# Patient Record
Sex: MALE | Race: WHITE
[De-identification: names, ages, dates, MRNs, and addresses within clinical notes are randomized per-mention and may not be internally consistent; named-entity substitution may affect disease eponyms.]

---

## 2019-03-10 ENCOUNTER — HOSPITAL ENCOUNTER (EMERGENCY)
Dept: HOSPITAL 47 - EC | Age: 19
LOS: 1 days | Discharge: HOME | End: 2019-03-11
Payer: COMMERCIAL

## 2019-03-10 VITALS — TEMPERATURE: 97.8 F

## 2019-03-10 DIAGNOSIS — Z86.14: ICD-10-CM

## 2019-03-10 DIAGNOSIS — L50.9: Primary | ICD-10-CM

## 2019-03-10 PROCEDURE — 99284 EMERGENCY DEPT VISIT MOD MDM: CPT

## 2019-03-11 VITALS — SYSTOLIC BLOOD PRESSURE: 122 MMHG | HEART RATE: 73 BPM | RESPIRATION RATE: 14 BRPM | DIASTOLIC BLOOD PRESSURE: 61 MMHG

## 2019-03-11 NOTE — ED
General Adult HPI





- General


Chief complaint: Skin/Abscess/Foreign Body


Stated complaint: Allergic Reaction SANTOS/Skin Irritation


Time Seen by Provider: 03/11/19 00:10


Source: patient


Mode of arrival: ambulatory


Limitations: no limitations





- History of Present Illness


Initial comments: 





Dictation was produced using dragon dictation software. please excuse any 

grammatical, word or spelling errors. 





Chief Complaint: 18-year-old male presents with pruritic erythematous rash.





History of Present Illness: Now.  He took a nap earlier today.  Proximal forearm

as ago upon waking up with the from the nap patient noted a severe the EKG and 

erythematous rash.  He states that the rash did not hurt.  He went to the 

pharmacy and took a pill of Benadryl.  He states the rash is improved since 

then.  He states the rash is mostly gone.  Patient was concerned given to the 

emergency department.  Patient does not know of any food or drug ALLERGIES at 

this time.  Denies any changes in his soap or skin products.








The ROS documented in this emergency department record has been reviewed and 

confirmed by me.  Those systems with pertinent positive or negative responses 

have been documented in the HPI.  All other systems are other negative and/or 

noncontributory.








PHYSICAL EXAM:


General Impression: Alert and oriented x3, not in acute distress


HEENT: Normocephalic atraumatic, extra-ocular movements intact, pupils equal and

reactive to light bilaterally, mucous membranes moist.


Cardiovascular: Heart regular rate and rhythm, S1&S2 audible, no murmurs, rubs 

or gallops


Chest: Lungs clear to auscultation bilaterally, no rhonchi, no wheeze, no rales


Abdomen: Bowel sounds present, abdomen soft, non-tender, non-distended, no 

organomegaly


Musculoskeletal: Pulses present and equal in all extremities, no peripheral 

edema


Motor: Power 5/5 bilaterally, no focal deficits noted


Neurological: CN II-XII grossly intact, no focal motor or sensory deficits noted


Skin: Mild erythema to the anterior chest


Psych: Normal affect and mood





ED course: 18-year-old male presents with urticarial rash.  All signs upon 

arrival are within acceptable limits.  Patient told of the signs of severe 

ALLERGIC reaction to seek medical attention if experiences throat swelling, 

lightheadedness.  Vision otherwise told to keep a log of exposures to identify 

any sort of cause to rash.  Patient told to follow up with primary care 

physician should he experience chronic urticaria.  Patient otherwise feels well 

is given 1 dose of by mouth Decadron.  Patient clear for discharge.














- Related Data


                                    Allergies











Allergy/AdvReac Type Severity Reaction Status Date / Time


 


No Known Allergies Allergy   Verified 03/10/19 23:07














Review of Systems


ROS Statement: 


Those systems with pertinent positive or pertinent negative responses have been 

documented in the HPI.





ROS Other: All systems not noted in ROS Statement are negative.





Past Medical History


Past Medical History: No Reported History


History of Any Multi-Drug Resistant Organisms: MRSA


Date of last positivie culture/infection: 2006


MDRO Source:: right hip


Additional Past Surgical History / Comment(s): right hip surgery for MRSA 

removal,


Past Psychological History: No Psychological Hx Reported


Smoking Status: Never smoker


Past Alcohol Use History: Occasional


Past Drug Use History: Marijuana





General Exam


Limitations: no limitations





Course





                                   Vital Signs











  03/10/19 03/11/19





  23:01 00:08


 


Temperature 97.8 F 


 


Pulse Rate 74 73


 


Respiratory 17 14





Rate  


 


Blood Pressure 134/88 122/61


 


O2 Sat by Pulse 99 99





Oximetry  














Disposition


Clinical Impression: 


 Urticaria





Disposition: HOME SELF-CARE


Condition: Good


Instructions (If sedation given, give patient instructions):  Urticaria (ED)


Is patient prescribed a controlled substance at d/c from ED?: No


Referrals: 


None,Stated [Primary Care Provider] - 1-2 days


Time of Disposition: 00:17

## 2024-08-06 ENCOUNTER — HOSPITAL ENCOUNTER (EMERGENCY)
Dept: HOSPITAL 47 - EC | Age: 24
Discharge: HOME | End: 2024-08-06
Payer: COMMERCIAL

## 2024-08-06 PROCEDURE — 99283 EMERGENCY DEPT VISIT LOW MDM: CPT

## 2025-04-15 ENCOUNTER — HOSPITAL ENCOUNTER (OUTPATIENT)
Dept: HOSPITAL 47 - LABWHC1 | Age: 25
Discharge: HOME | End: 2025-04-15
Attending: PHYSICIAN ASSISTANT
Payer: COMMERCIAL

## 2025-04-15 ENCOUNTER — HOSPITAL ENCOUNTER (OUTPATIENT)
Dept: HOSPITAL 47 - RADXRMAIN | Age: 25
Discharge: HOME | End: 2025-04-15
Attending: PHYSICIAN ASSISTANT
Payer: COMMERCIAL

## 2025-04-15 DIAGNOSIS — Z00.01: Primary | ICD-10-CM

## 2025-04-15 DIAGNOSIS — Z11.1: ICD-10-CM

## 2025-04-15 DIAGNOSIS — R20.2: Primary | ICD-10-CM

## 2025-04-15 DIAGNOSIS — M54.50: ICD-10-CM

## 2025-04-15 DIAGNOSIS — F41.9: ICD-10-CM

## 2025-04-15 DIAGNOSIS — Z13.220: ICD-10-CM

## 2025-04-15 LAB
ALBUMIN SERPL-MCNC: 4.7 G/DL (ref 3.8–4.9)
ALBUMIN/GLOB SERPL: 1.74 RATIO (ref 1.6–3.17)
ALP SERPL-CCNC: 73 U/L (ref 41–126)
ALT SERPL-CCNC: 31 U/L (ref 10–49)
AST SERPL-CCNC: 26 U/L (ref 14–35)
BASOPHILS # BLD AUTO: 0.03 X 10*3/UL (ref 0–0.1)
BASOPHILS NFR BLD AUTO: 0.5 %
BUN SERPL-SCNC: 9.9 MG/DL (ref 9–27)
CALCIUM SPEC-MCNC: 9.8 MG/DL (ref 8.7–10.3)
CHLORIDE SERPL-SCNC: 104 MMOL/L (ref 96–109)
CO2 SERPL-SCNC: 25.2 MMOL/L (ref 21.6–31.8)
EOSINOPHIL # BLD AUTO: 0.14 X 10*3/UL (ref 0.04–0.35)
EOSINOPHIL NFR BLD AUTO: 2.4 %
ERYTHROCYTE [DISTWIDTH] IN BLOOD BY AUTOMATED COUNT: 5.72 X 10*6/UL (ref 4.4–5.6)
ERYTHROCYTE [DISTWIDTH] IN BLOOD: 12.4 % (ref 11.5–14.5)
GLOBULIN SER CALC-MCNC: 2.7 G/DL (ref 1.6–3.3)
GLUCOSE SERPL-MCNC: 90 MG/DL (ref 70–110)
HCT VFR BLD AUTO: 47.8 % (ref 39.6–50)
HGB BLD-MCNC: 15.9 G/DL (ref 13–17)
LYMPHOCYTES # SPEC AUTO: 1.62 X 10*3/UL (ref 0.9–5)
LYMPHOCYTES NFR SPEC AUTO: 27.8 %
MCH RBC QN AUTO: 27.8 PG (ref 27–32)
MCHC RBC AUTO-ENTMCNC: 33.3 G/DL (ref 32–37)
MCV RBC AUTO: 83.6 FL (ref 80–97)
MONOCYTES # BLD AUTO: 0.45 X 10*3/UL (ref 0.2–1)
MONOCYTES NFR BLD AUTO: 7.7 %
NEUTROPHILS # BLD AUTO: 3.57 X 10*3/UL (ref 1.8–7.7)
NEUTROPHILS NFR BLD AUTO: 61.3 %
NRBC BLD AUTO-RTO: 0 X 10*3/UL (ref 0–0.01)
PLATELET # BLD AUTO: 321 X 10*3/UL (ref 140–440)
POTASSIUM SERPL-SCNC: 4.5 MMOL/L (ref 3.5–5.5)
PROT SERPL-MCNC: 7.4 G/DL (ref 6.2–8.2)
SODIUM SERPL-SCNC: 140 MMOL/L (ref 135–145)
WBC # BLD AUTO: 5.83 X 10*3/UL (ref 4.5–10)

## 2025-04-15 PROCEDURE — 36415 COLL VENOUS BLD VENIPUNCTURE: CPT

## 2025-04-15 PROCEDURE — 73521 X-RAY EXAM HIPS BI 2 VIEWS: CPT

## 2025-04-15 PROCEDURE — 84443 ASSAY THYROID STIM HORMONE: CPT

## 2025-04-15 PROCEDURE — 72110 X-RAY EXAM L-2 SPINE 4/>VWS: CPT

## 2025-04-15 PROCEDURE — 85025 COMPLETE CBC W/AUTO DIFF WBC: CPT

## 2025-04-15 PROCEDURE — 86480 TB TEST CELL IMMUN MEASURE: CPT

## 2025-04-15 PROCEDURE — 80061 LIPID PANEL: CPT

## 2025-04-15 PROCEDURE — 80053 COMPREHEN METABOLIC PANEL: CPT
